# Patient Record
Sex: FEMALE | Race: AMERICAN INDIAN OR ALASKA NATIVE
[De-identification: names, ages, dates, MRNs, and addresses within clinical notes are randomized per-mention and may not be internally consistent; named-entity substitution may affect disease eponyms.]

---

## 2020-11-04 ENCOUNTER — HOSPITAL ENCOUNTER (EMERGENCY)
Dept: HOSPITAL 5 - ED | Age: 25
Discharge: HOME | End: 2020-11-04
Payer: SELF-PAY

## 2020-11-04 VITALS — SYSTOLIC BLOOD PRESSURE: 117 MMHG | DIASTOLIC BLOOD PRESSURE: 68 MMHG

## 2020-11-04 DIAGNOSIS — Z79.899: ICD-10-CM

## 2020-11-04 DIAGNOSIS — B35.1: Primary | ICD-10-CM

## 2020-11-04 DIAGNOSIS — B37.3: ICD-10-CM

## 2020-11-04 DIAGNOSIS — Z98.890: ICD-10-CM

## 2020-11-04 LAB
BILIRUB UR QL STRIP: (no result)
BLOOD UR QL VISUAL: (no result)
MUCOUS THREADS #/AREA URNS HPF: (no result) /HPF
PH UR STRIP: 8 [PH] (ref 5–7)
PROT UR STRIP-MCNC: (no result) MG/DL
RBC #/AREA URNS HPF: 3 /HPF (ref 0–6)
UROBILINOGEN UR-MCNC: < 2 MG/DL (ref ?–2)
WBC #/AREA URNS HPF: 1 /HPF (ref 0–6)

## 2020-11-04 PROCEDURE — 81001 URINALYSIS AUTO W/SCOPE: CPT

## 2020-11-04 PROCEDURE — 81025 URINE PREGNANCY TEST: CPT

## 2020-11-04 NOTE — EVENT NOTE
ED Screening Note


ED Screening Note: 





+dysuria a few days ago 


+vaginal discharge


states she is on abx for a dental issue and believes she may have a yeast 

infection 


no abd pain 


no n/v/d


no fever


LNMP: 10/7/2020 





onchomycosis of the toes 











This initial assessment/diagnostic orders/clinical plan/treatment(s) is/are 

subject to change based on patients health status, clinical progression and re-

assessment by fellow clinical providers in the ED. Further treatment and workup 

at subsequent clinical providers discretion. Patient/guardian urged not to elope

from the ED as their condition may be serious if not clinically assessed and 

managed. 





Initial orders include: 


ua, urine preg

## 2020-11-04 NOTE — EMERGENCY DEPARTMENT REPORT
ED General Adult HPI





- General


Chief complaint: Urogenital-Female


Stated complaint: LOWER BODY PAIN


Time Seen by Provider: 11/04/20 14:49


Source: patient


Mode of arrival: Ambulatory


Limitations: No Limitations





- History of Present Illness


Initial comments: 





25-year-old female with no significant past medical history presented with chief

complaints of "a toenail issue" and vaginal irritation.  She states that she is 

currently on antibiotics for a dental infection and has been on those for about 

1 week and for the past 3 days has had vaginal itching and a white discharge and

is concerned she has a yeast infection.  States that she is not really concerned

for STIs today and believes this is antibiotic related.  She also reports that 

for several weeks she has noticed that her toenails especially the great toes 

have been thickened and raising up off the nailbed.  Symptoms are mild in 

nature, nonradiating, no alleviating or exacerbating factors.  No other 

associated symptoms.  Denies any abdominal pain, fevers, vomiting.





- Related Data


                                  Previous Rx's











 Medication  Instructions  Recorded  Last Taken  Type


 


Ibuprofen [Motrin] 600 mg PO Q8H PRN #20 tablet 05/17/19 Unknown Rx


 


methOCARBAMOL [Robaxin TAB] 500 mg PO Q6H PRN #14 tablet 05/17/19 Unknown Rx


 


traMADoL [Ultram] 50 mg PO Q6HR PRN #12 tablet 05/17/19 Unknown Rx


 


Fluconazole [Diflucan TAB] 200 mg PO ONCE #2 tablet 11/04/20 Unknown Rx


 


Terbinafine HCl [Antifungal 1% 1 applicatio TP BID #30 g 11/04/20 Unknown Rx





CREAM]    











                                    Allergies











Allergy/AdvReac Type Severity Reaction Status Date / Time


 


No Known Allergies Allergy   Verified 05/17/19 09:59














ED Review of Systems


ROS: 


Stated complaint: LOWER BODY PAIN


Other details as noted in HPI





Comment: All other systems reviewed and negative


Genitourinary: as per HPI





ED Past Medical Hx





- Past Medical History


Previous Medical History?: No





- Surgical History


Past Surgical History?: Yes


Additional Surgical History: cyst removal left breast, cyst removal neck, 

brazilian butt lift





- Social History


Smoking Status: Never Smoker


Substance Use Type: None





- Medications


Home Medications: 


                                Home Medications











 Medication  Instructions  Recorded  Confirmed  Last Taken  Type


 


Ibuprofen [Motrin] 600 mg PO Q8H PRN #20 tablet 05/17/19  Unknown Rx


 


methOCARBAMOL [Robaxin TAB] 500 mg PO Q6H PRN #14 tablet 05/17/19  Unknown Rx


 


traMADoL [Ultram] 50 mg PO Q6HR PRN #12 tablet 05/17/19  Unknown Rx


 


Fluconazole [Diflucan TAB] 200 mg PO ONCE #2 tablet 11/04/20  Unknown Rx


 


Terbinafine HCl [Antifungal 1% 1 applicatio TP BID #30 g 11/04/20  Unknown Rx





CREAM]     














ED Physical Exam





- General


Limitations: No Limitations


General appearance: alert, in no apparent distress





- Head


Head exam: Present: atraumatic, normocephalic





- Eye


Eye exam: Present: normal appearance





- ENT


ENT exam: Present: mucous membranes moist





- Neck


Neck exam: Present: normal inspection





- Respiratory


Respiratory exam: Present: normal lung sounds bilaterally.  Absent: respiratory 

distress





- Cardiovascular


Cardiovascular Exam: Present: regular rate, normal rhythm.  Absent: systolic 

murmur, diastolic murmur, rubs, gallop





- GI/Abdominal


GI/Abdominal exam: Present: soft, normal bowel sounds.  Absent: tenderness, 

guarding





- 


External exam: Present: other (Patient deferred)





- Extremities Exam


Extremities exam: Present: other (Onychomycosis suspected to the great toenails 

however gel nail polish in place limits assessment, no sign of bacterial 

infection)





- Back Exam


Back exam: Present: normal inspection





- Neurological Exam


Neurological exam: Present: alert, oriented X3





- Psychiatric


Psychiatric exam: Present: normal affect, normal mood





- Skin


Skin exam: Present: warm, dry, intact, normal color.  Absent: rash





ED Course


                                   Vital Signs











  11/04/20





  14:04


 


Temperature 98.3 F


 


Pulse Rate 85


 


Respiratory 16





Rate 


 


Blood Pressure 117/68





[Left] 


 


O2 Sat by Pulse 100





Oximetry 














ED Medical Decision Making





- Medical Decision Making





Patient present with complaints of abnormal toenails, exam consistent with 

onychomycosis.  Will prescribe topical therapy first and advise if oral therapy 

necessary should follow up with primary care.  She also has complaints of 

possible yeast infection after being started on antibiotics.  She is also 

concerned about BV but states she is not concerned about STIs.  She does not 

want a pelvic exam today understanding that I cannot officially diagnose BV 

without taking cultures.  We will treat with a single dose of Flagyl and 

discharged on fluconazole and topical therapy for onychomycosis.





- Differential Diagnosis


Candidal vulvovaginitis, onychomycosis, BV, STI


Critical care attestation.: 


If time is entered above; I have spent that time in minutes in the direct care 

of this critically ill patient, excluding procedure time.








ED Disposition


Clinical Impression: 


 Onychomycosis, Vaginal yeast infection





Disposition: DC-01 TO HOME OR SELFCARE


Is pt being admited?: No


Condition: Good


Instructions:  Fungal Nail Infection, Vaginal Yeast Infection, Adult


Prescriptions: 


Terbinafine HCl [Antifungal 1% CREAM] 1 applicatio TP BID #30 g


Fluconazole [Diflucan TAB] 200 mg PO ONCE #2 tablet


Referrals: 


SANG DARNELL MD [Staff Physician] - 3-5 Days


Time of Disposition: 16:45

## 2021-02-05 ENCOUNTER — HOSPITAL ENCOUNTER (EMERGENCY)
Dept: HOSPITAL 5 - ED | Age: 26
Discharge: HOME | End: 2021-02-05
Payer: SELF-PAY

## 2021-02-05 VITALS — DIASTOLIC BLOOD PRESSURE: 52 MMHG | SYSTOLIC BLOOD PRESSURE: 114 MMHG

## 2021-02-05 DIAGNOSIS — K04.7: Primary | ICD-10-CM

## 2021-02-05 DIAGNOSIS — B35.1: ICD-10-CM

## 2021-02-05 DIAGNOSIS — Z98.890: ICD-10-CM

## 2021-02-05 DIAGNOSIS — Z79.899: ICD-10-CM

## 2021-02-05 PROCEDURE — 99282 EMERGENCY DEPT VISIT SF MDM: CPT

## 2021-02-05 NOTE — EMERGENCY DEPARTMENT REPORT
ED ENT HPI





- General


Chief complaint: Dental/Oral


Stated complaint: MOUTH PAIN


Time Seen by Provider: 02/05/21 21:20


Source: patient


Mode of arrival: Ambulatory


Limitations: Language Barrier





- History of Present Illness


Initial comments: 


25-year-old female presents to the ER today complaining of 2 to 3-day history of

pain and swelling to her left lower gum.  She states that she does not have a 

tooth in that area. She states that she think it might have broken off a long 

time ago.  She reports mild discoloration to the gum.  She denies any injury or 

bleeding.  She denies any trismus, drooling, difficulty breathing or sore 

throat.





She also complains of discoloration to the right great toenail.  She states that

it feels like it is about to come off.  He states that she noticed it about 1 

week ago.  She denies any injury.  She reports no other associated symptoms.





MD complaint: tooth pain, other (Toenail  discoloration)





- Related Data


                                  Previous Rx's











 Medication  Instructions  Recorded  Last Taken  Type


 


methOCARBAMOL [Robaxin TAB] 500 mg PO Q6H PRN #14 tablet 05/17/19 Unknown Rx


 


Fluconazole [Diflucan TAB] 200 mg PO ONCE #2 tablet 11/04/20 Unknown Rx


 


Terbinafine HCl [Antifungal 1% 1 applicatio TP BID #30 g 11/04/20 Unknown Rx





CREAM]    


 


Clindamycin [Clindamycin CAP] 300 mg PO Q6H #40 capsule 02/05/21 Unknown Rx


 


Ibuprofen [Motrin 600 MG tab] 600 mg PO Q8H PRN #20 tablet 02/05/21 Unknown Rx


 


traMADoL [Ultram 50 MG tab] 50 mg PO Q6HR PRN #12 tablet 02/05/21 Unknown Rx











                                    Allergies











Allergy/AdvReac Type Severity Reaction Status Date / Time


 


No Known Allergies Allergy   Verified 05/17/19 09:59














ED Dental HPI





- General


Chief complaint: Dental/Oral


Stated complaint: MOUTH PAIN


Time Seen by Provider: 02/05/21 21:20


Source: patient


Mode of arrival: Ambulatory


Limitations: Language Barrier





- Related Data


                                  Previous Rx's











 Medication  Instructions  Recorded  Last Taken  Type


 


methOCARBAMOL [Robaxin TAB] 500 mg PO Q6H PRN #14 tablet 05/17/19 Unknown Rx


 


Fluconazole [Diflucan TAB] 200 mg PO ONCE #2 tablet 11/04/20 Unknown Rx


 


Terbinafine HCl [Antifungal 1% 1 applicatio TP BID #30 g 11/04/20 Unknown Rx





CREAM]    


 


Clindamycin [Clindamycin CAP] 300 mg PO Q6H #40 capsule 02/05/21 Unknown Rx


 


Ibuprofen [Motrin 600 MG tab] 600 mg PO Q8H PRN #20 tablet 02/05/21 Unknown Rx


 


traMADoL [Ultram 50 MG tab] 50 mg PO Q6HR PRN #12 tablet 02/05/21 Unknown Rx











                                    Allergies











Allergy/AdvReac Type Severity Reaction Status Date / Time


 


No Known Allergies Allergy   Verified 05/17/19 09:59














ED Review of Systems


ROS: 


Stated complaint: MOUTH PAIN


Other details as noted in HPI





Comment: All other systems reviewed and negative


Constitutional: denies: chills, fever


ENT: other (Right lower gum swelling, pain, discoloration)


Respiratory: denies: cough, shortness of breath, wheezing


Cardiovascular: denies: chest pain, palpitations


Skin: change in hair/nails





ED Past Medical Hx





- Past Medical History


Previous Medical History?: No





- Surgical History


Past Surgical History?: Yes


Additional Surgical History: cyst removal left breast, cyst removal neck, nicki

thomas butt lift





- Social History


Smoking Status: Never Smoker


Substance Use Type: None





- Medications


Home Medications: 


                                Home Medications











 Medication  Instructions  Recorded  Confirmed  Last Taken  Type


 


methOCARBAMOL [Robaxin TAB] 500 mg PO Q6H PRN #14 tablet 05/17/19  Unknown Rx


 


Fluconazole [Diflucan TAB] 200 mg PO ONCE #2 tablet 11/04/20  Unknown Rx


 


Terbinafine HCl [Antifungal 1% 1 applicatio TP BID #30 g 11/04/20  Unknown Rx





CREAM]     


 


Clindamycin [Clindamycin CAP] 300 mg PO Q6H #40 capsule 02/05/21  Unknown Rx


 


Ibuprofen [Motrin 600 MG tab] 600 mg PO Q8H PRN #20 tablet 02/05/21  Unknown Rx


 


traMADoL [Ultram 50 MG tab] 50 mg PO Q6HR PRN #12 tablet 02/05/21  Unknown Rx














ED Physical Exam





- General


Limitations: Language Barrier


General appearance: alert, in no apparent distress





- Head


Head exam: Present: atraumatic, normocephalic, normal inspection





- Eye


Eye exam: Present: normal appearance, PERRL, EOMI


Pupils: Present: normal accommodation





- ENT


ENT exam: Present: normal exam, mucous membranes moist, other (Patient has what 

looks like a dental abscess to her left lower gum, around the area of the left 

first molar; no apparent tooth to that area; mild ecchymosis noted to the gum; 

no facial cellulitis no trismus or drooling)





- Neck


Neck exam: Present: normal inspection, full ROM, lymphadenopathy (Mild left; no 

evidence of Kamlesh).  Absent: meningismus





- Respiratory


Respiratory exam: Present: normal lung sounds bilaterally.  Absent: respiratory 

distress





- Cardiovascular


Cardiovascular Exam: Present: regular rate, normal rhythm, normal heart sounds





- Extremities Exam


Extremities exam: Present: other (Right great toenail noted to be thickened, and

discolored, consistent with possible fungal infection; no evidence of secondary 

bacterial infection)





- Neurological Exam


Neurological exam: Present: alert, oriented X3, CN II-XII intact





- Psychiatric


Psychiatric exam: Present: normal affect, normal mood





- Skin


Skin exam: Present: intact





ED Course


                                   Vital Signs











  02/05/21





  20:54


 


Temperature 99.3 F


 


Pulse Rate 72


 


Respiratory 16





Rate 


 


Blood Pressure 114/52


 


O2 Sat by Pulse 94





Oximetry 











Critical care attestation.: 


If time is entered above; I have spent that time in minutes in the direct care 

of this critically ill patient, excluding procedure time.








ED Disposition


Clinical Impression: 


 Dental abscess, Fungal toenail infection





Disposition: DC-01 TO HOME OR SELFCARE


Is pt being admited?: No


Does the pt Need Aspirin: No


Condition: Stable


Instructions:  Fungal Nail Infection


Additional Instructions: 


Take the pain medication and antibiotics as prescribed.  Follow-up with the 

local dental clinic given on your discharge instructions.  Also follow-up with a

local podiatrist for further evaluation of your toenail as discussed.  Return to

the ER if your symptoms changes or worsens in any way.


Prescriptions: 


Clindamycin [Clindamycin CAP] 300 mg PO Q6H #40 capsule


Ibuprofen [Motrin 600 MG tab] 600 mg PO Q8H PRN #20 tablet


 PRN Reason: Pain


traMADoL [Ultram 50 MG tab] 50 mg PO Q6HR PRN #12 tablet


 PRN Reason: Pain


Referrals: 


[facility] [Outside] - 3-5 Days


Time of Disposition: 21:47